# Patient Record
Sex: MALE | Race: WHITE | NOT HISPANIC OR LATINO | Employment: PART TIME | URBAN - METROPOLITAN AREA
[De-identification: names, ages, dates, MRNs, and addresses within clinical notes are randomized per-mention and may not be internally consistent; named-entity substitution may affect disease eponyms.]

---
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
## 2020-08-03 ENCOUNTER — COMMUNICATION - HEALTHEAST (OUTPATIENT)
Dept: SCHEDULING | Facility: CLINIC | Age: 20
End: 2020-08-03

## 2020-08-03 ENCOUNTER — COMMUNICATION - HEALTHEAST (OUTPATIENT)
Dept: UROLOGY | Facility: CLINIC | Age: 20
End: 2020-08-03

## 2020-08-03 ENCOUNTER — OFFICE VISIT - HEALTHEAST (OUTPATIENT)
Dept: UROLOGY | Facility: CLINIC | Age: 20
End: 2020-08-03

## 2020-08-03 DIAGNOSIS — N13.2 HYDRONEPHROSIS WITH URINARY OBSTRUCTION DUE TO URETERAL CALCULUS: ICD-10-CM

## 2020-08-03 DIAGNOSIS — R10.9 LEFT FLANK PAIN: ICD-10-CM

## 2020-08-03 DIAGNOSIS — N20.1 CALCULUS OF URETER: ICD-10-CM

## 2020-08-05 ENCOUNTER — AMBULATORY - HEALTHEAST (OUTPATIENT)
Dept: UROLOGY | Facility: CLINIC | Age: 20
End: 2020-08-05

## 2020-08-05 ENCOUNTER — COMMUNICATION - HEALTHEAST (OUTPATIENT)
Dept: UROLOGY | Facility: CLINIC | Age: 20
End: 2020-08-05

## 2020-08-05 ENCOUNTER — AMBULATORY - HEALTHEAST (OUTPATIENT)
Dept: LAB | Facility: CLINIC | Age: 20
End: 2020-08-05

## 2020-08-05 DIAGNOSIS — N20.1 CALCULUS OF URETER: ICD-10-CM

## 2020-08-07 ENCOUNTER — COMMUNICATION - HEALTHEAST (OUTPATIENT)
Dept: SCHEDULING | Facility: CLINIC | Age: 20
End: 2020-08-07

## 2020-08-07 ENCOUNTER — ANESTHESIA - HEALTHEAST (OUTPATIENT)
Dept: SURGERY | Facility: CLINIC | Age: 20
End: 2020-08-07

## 2020-08-07 ENCOUNTER — SURGERY - HEALTHEAST (OUTPATIENT)
Dept: SURGERY | Facility: CLINIC | Age: 20
End: 2020-08-07

## 2020-08-07 ASSESSMENT — MIFFLIN-ST. JEOR: SCORE: 1886.22

## 2020-08-10 ENCOUNTER — AMBULATORY - HEALTHEAST (OUTPATIENT)
Dept: SURGERY | Facility: CLINIC | Age: 20
End: 2020-08-10

## 2020-08-10 ENCOUNTER — AMBULATORY - HEALTHEAST (OUTPATIENT)
Dept: UROLOGY | Facility: CLINIC | Age: 20
End: 2020-08-10

## 2020-08-10 DIAGNOSIS — N20.1 CALCULUS OF URETER: ICD-10-CM

## 2020-08-10 DIAGNOSIS — Z11.59 ENCOUNTER FOR SCREENING FOR OTHER VIRAL DISEASES: ICD-10-CM

## 2020-08-18 ENCOUNTER — AMBULATORY - HEALTHEAST (OUTPATIENT)
Dept: LAB | Facility: CLINIC | Age: 20
End: 2020-08-18

## 2020-08-18 DIAGNOSIS — Z11.59 ENCOUNTER FOR SCREENING FOR OTHER VIRAL DISEASES: ICD-10-CM

## 2020-08-19 ENCOUNTER — COMMUNICATION - HEALTHEAST (OUTPATIENT)
Dept: UROLOGY | Facility: CLINIC | Age: 20
End: 2020-08-19

## 2020-08-19 DIAGNOSIS — N20.1 CALCULUS OF URETER: ICD-10-CM

## 2020-08-19 DIAGNOSIS — R11.0 NAUSEA: ICD-10-CM

## 2020-08-20 ENCOUNTER — ANESTHESIA - HEALTHEAST (OUTPATIENT)
Dept: SURGERY | Facility: CLINIC | Age: 20
End: 2020-08-20

## 2020-08-21 ENCOUNTER — SURGERY - HEALTHEAST (OUTPATIENT)
Dept: SURGERY | Facility: CLINIC | Age: 20
End: 2020-08-21

## 2020-08-21 ENCOUNTER — COMMUNICATION - HEALTHEAST (OUTPATIENT)
Dept: UROLOGY | Facility: CLINIC | Age: 20
End: 2020-08-21

## 2020-08-21 DIAGNOSIS — N20.1 CALCULUS OF URETER: ICD-10-CM

## 2020-08-21 ASSESSMENT — MIFFLIN-ST. JEOR: SCORE: 1777.36

## 2020-08-27 ENCOUNTER — AMBULATORY - HEALTHEAST (OUTPATIENT)
Dept: UROLOGY | Facility: CLINIC | Age: 20
End: 2020-08-27

## 2020-08-27 ENCOUNTER — COMMUNICATION - HEALTHEAST (OUTPATIENT)
Dept: UROLOGY | Facility: CLINIC | Age: 20
End: 2020-08-27

## 2020-08-27 DIAGNOSIS — N20.1 CALCULUS OF URETER: ICD-10-CM

## 2020-08-31 ENCOUNTER — COMMUNICATION - HEALTHEAST (OUTPATIENT)
Dept: UROLOGY | Facility: CLINIC | Age: 20
End: 2020-08-31

## 2021-05-25 ENCOUNTER — RECORDS - HEALTHEAST (OUTPATIENT)
Dept: ADMINISTRATIVE | Facility: CLINIC | Age: 21
End: 2021-05-25

## 2021-05-30 ENCOUNTER — RECORDS - HEALTHEAST (OUTPATIENT)
Dept: ADMINISTRATIVE | Facility: CLINIC | Age: 21
End: 2021-05-30

## 2021-06-04 VITALS — HEIGHT: 74 IN | WEIGHT: 156 LBS | BODY MASS INDEX: 20.02 KG/M2

## 2021-06-04 VITALS — HEIGHT: 74 IN | BODY MASS INDEX: 23.1 KG/M2 | WEIGHT: 180 LBS

## 2021-06-10 NOTE — TELEPHONE ENCOUNTER
Patient called stating that he just arrived home from surgery and he will not have enough pain medication to last until his stent removal.  He is requesting a refill.  He states that he was given 4 today, but that will not be enough.  Geri Danielson RN

## 2021-06-10 NOTE — ANESTHESIA POSTPROCEDURE EVALUATION
Patient: Jarvis Brooks  Procedure(s):  CYSTOURETEROSCOPY, WITH RETROGRADE PYELOGRAM, AND STENT INSERTION LEFT (Left)  Anesthesia type: general    Patient location: Phase II Recovery  Last vitals:   Vitals Value Taken Time   /74 8/7/2020  3:15 PM   Temp 36.7  C (98.1  F) 8/7/2020  3:15 PM   Pulse 65 8/7/2020  3:18 PM   Resp 16 8/7/2020  3:15 PM   SpO2 100 % 8/7/2020  3:18 PM   Vitals shown include unvalidated device data.  Post vital signs: stable  Level of consciousness: awake, alert and oriented  Post-anesthesia pain: pain controlled  Post-anesthesia nausea and vomiting: no  Pulmonary: unassisted, return to baseline  Cardiovascular: stable and blood pressure at baseline  Hydration: adequate  Anesthetic events: no    QCDR Measures:  ASA# 11 - Amaya-op Cardiac Arrest: ASA11B - Patient did NOT experience unanticipated cardiac arrest  ASA# 12 - Amaya-op Mortality Rate: ASA12B - Patient did NOT die  ASA# 13 - PACU Re-Intubation Rate: ASA13B - Patient did NOT require a new airway mgmt  ASA# 10 - Composite Anes Safety: ASA10A - No serious adverse event    Additional Notes:

## 2021-06-10 NOTE — ANESTHESIA PREPROCEDURE EVALUATION
Anesthesia Evaluation      Patient summary reviewed   No history of anesthetic complications     Airway   Mallampati: I   Pulmonary - normal exam   (+) asthma  a smoker                         Cardiovascular - normal exam  Exercise tolerance: > or = 4 METS   Neuro/Psych    (+) depression,     Comments: ADHD    Endo/Other - negative ROS      GI/Hepatic/Renal    (+)   chronic renal disease (Nephrolithiasis),   (-) GERD          Dental - normal exam                        Anesthesia Plan  Planned anesthetic: general LMA and total IV anesthesia  COVID negative 8/5/20  ASA 2   Induction: intravenous   Anesthetic plan and risks discussed with: patient  Anesthesia plan special considerations: antiemetics,   Post-op plan: routine recovery

## 2021-06-10 NOTE — ANESTHESIA CARE TRANSFER NOTE
Last vitals:   Vitals:    08/21/20 0825   BP: 105/70   Pulse: (!) 58   Resp: 14   Temp: 36.2  C (97.2  F)   SpO2: 100%     Patient's level of consciousness is awake  Spontaneous respirations: yes  Maintains airway independently: yes  Dentition unchanged: yes  Oropharynx: oropharynx clear of all foreign objects    QCDR Measures:  ASA# 20 - Surgical Safety Checklist: WHO surgical safety checklist completed prior to induction    PQRS# 430 - Adult PONV Prevention: 4558F - Pt received => 2 anti-emetic agents (different classes) preop & intraop  ASA# 8 - Peds PONV Prevention: NA - Not pediatric patient, not GA or 2 or more risk factors NOT present  PQRS# 424 - Amaya-op Temp Management: 4559F - At least one body temp DOCUMENTED => 35.5C or 95.9F within required timeframe  PQRS# 426 - PACU Transfer Protocol: - Transfer of care checklist used  ASA# 14 - Acute Post-op Pain: ASA14B - Patient did NOT experience pain >= 7 out of 10

## 2021-06-10 NOTE — TELEPHONE ENCOUNTER
Patient has upcoming surgery on Friday for kidney stone.  He is requesting a refill of pain medication and zofran.  zofran was ordered.  Geri Danielson RN

## 2021-06-10 NOTE — PROGRESS NOTES
Patient came in to clinic for stent removal.  Stent was removed intact and patient tolerated the procedure.  Written and verbal instructions and after care given to patient.  Geri Danielson RN

## 2021-06-10 NOTE — TELEPHONE ENCOUNTER
Message left for patient to call clinic to set up an appointment for kidney stone follow-up for today or tomorrow.  Geri Danielson RN

## 2021-06-10 NOTE — ANESTHESIA CARE TRANSFER NOTE
Last vitals:   Vitals:    08/07/20 1354   BP: 110/54   Pulse: (!) 57   Resp: 12   Temp: 36  C (96.8  F)   SpO2: 100%     Patient's level of consciousness is drowsy  Spontaneous respirations: yes  Maintains airway independently: yes  Dentition unchanged: yes  Oropharynx: oral airway in place    QCDR Measures:  ASA# 20 - Surgical Safety Checklist: WHO surgical safety checklist completed prior to induction    PQRS# 430 - Adult PONV Prevention: 4558F - Pt received => 2 anti-emetic agents (different classes) preop & intraop  ASA# 8 - Peds PONV Prevention: NA - Not pediatric patient, not GA or 2 or more risk factors NOT present  PQRS# 424 - Amaya-op Temp Management: 4559F - At least one body temp DOCUMENTED => 35.5C or 95.9F within required timeframe  PQRS# 426 - PACU Transfer Protocol: - Transfer of care checklist used  ASA# 14 - Acute Post-op Pain: ASA14B - Patient did NOT experience pain >= 7 out of 10

## 2021-06-10 NOTE — TELEPHONE ENCOUNTER
"Pt called stated he had a kidney surgery today, and they didn't remove the stone, and he was sent home with stent placed. According to the Surgeon Sera A  note\" all efforts are made to clear stone in this operative procedure but access to the stone is unfortunately impossible\" pt was explained why he has stent placed, and that he need to be seen in two weeks in the OR for stone clearance. Pt stated he wants to know if the stone will come out in the urine, and if he can talk to the on call provider, writer paged the on call provider and related the pt's question, provider stated it is unlikely the stone to passes in the urine. Pt was informed, and he requested a phone call regarding setting up the appointment in two weeks. Pt was offered the scheduling phone number, stated he wants a phone call. Message will be sent to ENEDINA Crook RN  "

## 2021-06-10 NOTE — TELEPHONE ENCOUNTER
Patient returned call and states that he would like to have his stone removed as it has been extremely painful.  Provider updated and start the process.  Geri Danielson RN

## 2021-06-10 NOTE — ANESTHESIA PREPROCEDURE EVALUATION
Anesthesia Evaluation      Patient summary reviewed   No history of anesthetic complications     Airway   Mallampati: I   Pulmonary - normal exam   (+) asthma  a smoker                         Cardiovascular - normal exam  Exercise tolerance: > or = 4 METS   Neuro/Psych    (+) depression,     Comments: ADHD    Endo/Other - negative ROS      GI/Hepatic/Renal    (+)   chronic renal disease (Nephrolithiasis),   (-) GERD          Dental - normal exam                          Anesthesia Plan  Planned anesthetic: general LMA  COVID negative 8/5/20    Background propofol  Decadron 10 mg  Zofran  ASA 2   Induction: intravenous   Anesthetic plan and risks discussed with: patient  Anesthesia plan special considerations: antiemetics,   Post-op plan: routine recovery

## 2021-06-10 NOTE — PROGRESS NOTES
"Assessment/Plan:        Diagnoses and all orders for this visit:    Calculus of ureter  -     Symptom Control While Passing a Stone Education  -     CT Abdomen Pelvis Without Oral Without IV Contrast; Future; Expected date: 08/17/2020  -     Patient Stated Goal: Pass my stone    Hydronephrosis with urinary obstruction due to ureteral calculus    Left flank pain    Other orders  -     tamsulosin (FLOMAX) 0.4 mg cap; Take 0.4 mg by mouth daily.      Stone Management Plan  [facility] Stone Management 8/3/2020   Urinary Tract Infection No suspicion of infection   Renal Colic Well controlled symptoms   Renal Failure No suspicion of renal failure   Current CT date 8/2/2020   R Stone Event No current event   Left sided stones? Yes   L Number of ureteral stones 1   L GSD of ureteral stones 4   L Location of ureteral stone Proximal   L Number of kidney stones  No renal stones   L GSD of kidney stones N/A   L Hydronephrosis Mild   L Stone Event New event   Diagnosis date 8/2/2020   Initial location of primary symptomatic stone Proximal   Initial GSD of primary symptomatic stone 4   L MET Status Initiation   L Current Plan MET   MET 2 week F/U         Phone call duration: 10 minutes    Annabel Shabazz PA-C    Subjective:        The patient has been notified of following:     \"This telephone visit will be conducted via a call between you and your physician/provider. We have found that certain health care needs can be provided without the need for a physical exam.  This service lets us provide the care you need with a short phone conversation. If a prescription is necessary, we can send it directly to your pharmacy.  If labs and/or imaging are needed, we can place orders so that you can have the test (s) done at a later time.    If during the course of the call the physician/provider feels a telephone visit is not appropriate, you will not be charged for this service.\"     HPI  Mr. Jarvis Brooks is a 20 y.o.  male who is being " evaluated via a billable telephone visit by [facility] following Dawson's ER visit for urolithiasis.    He is a first time unidentified composition stone former. He has no identified modifiable stone risk factors. He has identified non-modifiable stone risks including:  early age at first stone and limited family history.    He was seen in ER yesterday for acute onset left flank pain x 1 day. The pain occurred while he was driving home from [place]. It was constant and radiated to the left lower abdomen. It reached 9/10 at its worst with no relief following use of ibuprofen. He had associated chills, nausea, vomiting and shortness of breath due to pain intensity. Workup was notable for CT reporting an obstructing left ureteral stone. Labs were notable for suspicious UA, leukocytosis with slightly elevated CRP and mild renal injury. He was given dose of antibiotic and sent home with ciprofloxacin, zofran and oxycodone.    Significant current symptoms include: mild left flank pain and hematuria, taking tylenol. Pertinent negative current symptoms include:  fever, chills, right flank pain, nausea, vomiting, urinary frequency and dysuria.      CT scan from 8/2/20 is personally reviewed and demonstrates a mildly obstructing 4 mm left proximal ureteral stone.    Significant labs from presentation include severe hematuria, no pyuria, negative nitrite, moderate bacteria, no growth on urine culture, elevated WBC, slightly elevated C reactive protein, slightly elevated creatinine and normal potassium.    PLAN    21 yo M with newly diagnosed obstructing left ureteral stone, symptoms currently controlled.    Will proceed with medical expulsive therapy. Risks and benefits were detailed of medical expulsive therapy including probability of stone passage, recurrent renal colic, and requirement of emergency medical and/or surgical care and further imaging. Patient verbalized understanding. Patient  agrees with plan as discussed. He will return in 2 weeks with low dose CT scan.    For symptom control, he has oxycodone and ondansetron. Over the counter symptom control medications of ibuprofen, Dramamine and Tylenol were recommended.     ROS   A 12 point comprehensive review of systems is negative except for HPI    Past Medical History:   Diagnosis Date     ADHD (attention deficit hyperactivity disorder)      Appendicitis      Asthma, intermittent      Depression      Foot fracture      Kidney stones      Past Surgical History:   Procedure Laterality Date     APPENDECTOMY       Current Outpatient Medications   Medication Sig Dispense Refill     acetaminophen (TYLENOL) 500 MG tablet Take 500-1,000 mg by mouth every 6 (six) hours as needed for pain.       acetaminophen (TYLENOL) 500 MG tablet Take 2 tablets (1,000 mg total) by mouth 4 (four) times a day for 7 days. 56 tablet 0     albuterol (PROVENTIL HFA;VENTOLIN HFA) 90 mcg/actuation inhaler Inhale 2 puffs every 6 (six) hours as needed for wheezing.       albuterol (PROVENTIL) 2.5 mg /3 mL (0.083 %) nebulizer solution Take 2.5 mg by nebulization every 6 (six) hours as needed for wheezing.       azithromycin (ZITHROMAX) 250 MG tablet 500 mg x1 day then 250 mg x4 days. Prescription filled 9/10/14.       ciprofloxacin HCl (CIPRO) 500 MG tablet Take 1 tablet (500 mg total) by mouth 2 (two) times a day for 7 days. 14 tablet 0     citalopram (CELEXA) 10 MG tablet Take 10 mg by mouth daily.       clindamycin-benzoyl peroxide (BENZACLIN) gel Apply 1 application topically 2 (two) times a day. For acne       dimenhyDRINATE (DRAMAMINE) 50 MG tablet Take 1 tablet (50 mg total) by mouth at bedtime for 7 days. 7 tablet 0     dimenhyDRINATE (DRAMAMINE) 50 MG tablet Take 1 tablet (50 mg total) by mouth 4 (four) times a day as needed. 28 tablet 0     ibuprofen (ADVIL,MOTRIN) 200 MG tablet Take 2 tablets (400 mg total) by mouth 4 (four) times a day for 7 days. 56 tablet 0      methylphenidate (RITALIN LA) 10 MG 24 hr capsule Take 10 mg by mouth every morning. (take with 40 mg capsule to = 50 mg)       methylphenidate (RITALIN LA) 40 MG 24 hr capsule Take 40 mg by mouth every morning. (take with 10 mg cap to = 50 mg)       ondansetron (ZOFRAN ODT) 4 MG disintegrating tablet Take 1 tablet (4 mg total) by mouth every 8 (eight) hours as needed for nausea. 12 tablet 0     oxyCODONE (ROXICODONE) 5 MG immediate release tablet Every 4-6 hours as needed if pain is not improved with acetaminophen and ibuprofen. 12 tablet 0     No current facility-administered medications for this visit.      Allergies   Allergen Reactions     Amoxicillin Hives       Social History     Socioeconomic History     Marital status: Single     Spouse name: Not on file     Number of children: Not on file     Years of education: Not on file     Highest education level: Not on file   Occupational History     Not on file   Social Needs     Financial resource strain: Not on file     Food insecurity     Worry: Not on file     Inability: Not on file     Transportation needs     Medical: Not on file     Non-medical: Not on file   Tobacco Use     Smoking status: Current Every Day Smoker   Substance and Sexual Activity     Alcohol use: Not on file     Drug use: Not on file     Sexual activity: Not on file   Lifestyle     Physical activity     Days per week: Not on file     Minutes per session: Not on file     Stress: Not on file   Relationships     Social connections     Talks on phone: Not on file     Gets together: Not on file     Attends Alevism service: Not on file     Active member of club or organization: Not on file     Attends meetings of clubs or organizations: Not on file     Relationship status: Not on file     Intimate partner violence     Fear of current or ex partner: Not on file     Emotionally abused: Not on file     Physically abused: Not on file     Forced sexual activity: Not on file   Other Topics Concern      Not on file   Social History Narrative    Lives at home with mother and younger sister.        Family History   Problem Relation Age of Onset     Asthma Neg Hx      Allergies Unknown         multiple family members     Lung cancer Maternal Grandfather        Objective:      Physical Exam  There were no vitals filed for this visit.    Labs    Urinalysis POC (Office):  Nitrite, UA   Date Value Ref Range Status   08/02/2020 Negative Negative Final   03/03/2011 Negative (Negative) Final       Lab Urinalysis:  Blood, UA   Date Value Ref Range Status   08/02/2020 Large (!) Negative Final   03/03/2011 Large (!) (Negative) Final     Nitrite, UA   Date Value Ref Range Status   08/02/2020 Negative Negative Final   03/03/2011 Negative (Negative) Final     Leukocytes, UA   Date Value Ref Range Status   08/02/2020 Large (!) Negative Final   03/03/2011 Negative (Negative) Final     pH, UA   Date Value Ref Range Status   08/02/2020 6.0 4.5 - 8.0 Final   03/03/2011 6.5 4.5 - 8.0 Final    and Acute Labs   CBC   WBC   Date Value Ref Range Status   08/02/2020 17.3 (H) 4.0 - 11.0 thou/uL Final   05/24/2016 8.3 4.5 - 13.0 thou/uL Final   09/11/2014 13.2 (H) 5.0 - 11.5 thou/uL Final   08/14/2013 6.8 5.0 - 11.5 thou/uL Final     Hemoglobin   Date Value Ref Range Status   08/02/2020 15.9 14.0 - 18.0 g/dL Final   05/24/2016 14.8 13.0 - 16.0 g/dL Final   09/11/2014 15.1 14.0 - 18.0 g/dL Final     Platelets   Date Value Ref Range Status   08/02/2020 333 140 - 440 thou/uL Final   05/24/2016 233 140 - 440 thou/uL Final   09/11/2014 279 140 - 440 thou/uL Final   , C Reactive Protein    CRP   Date Value Ref Range Status   08/02/2020 2.5 (H) 0.0 - 0.8 mg/dL Final   , Renal Panel  [facility]  Creatinine   Date Value Ref Range Status   08/02/2020 1.69 (H) 0.70 - 1.30 mg/dL Final   08/06/2018 1.12 0.70 - 1.30 mg/dL Final   05/24/2016 0.97 0.70 - 1.30 mg/dL Final     Potassium   Date Value Ref Range Status   08/02/2020 3.6 3.5 - 5.0 mmol/L Final   08/06/2018  4.4 3.5 - 5.0 mmol/L Final   05/24/2016 4.0 3.5 - 5.0 mmol/L Final     Calcium   Date Value Ref Range Status   08/02/2020 10.4 8.5 - 10.5 mg/dL Final   08/06/2018 10.0 8.5 - 10.5 mg/dL Final   05/24/2016 9.2 8.5 - 10.5 mg/dL Final    and Urine Culture    Culture   Date Value Ref Range Status   08/02/2020 No Growth  Final   08/06/2018 No Growth  Final

## 2021-06-10 NOTE — ANESTHESIA POSTPROCEDURE EVALUATION
Patient: Jarvis Brooks  Procedure(s):  CYSTOSCOPY, STENT EXTRACTION, WITH FLEXIBLE URETEROSCOPIC CALCULUS REMOVAL  AND STENT INSERTION (Left)  Anesthesia type: general    Patient location: Phase II Recovery  Last vitals:   Vitals Value Taken Time   /58 8/21/2020  9:45 AM   Temp 36.7  C (98  F) 8/21/2020  8:45 AM   Pulse 69 8/21/2020  9:45 AM   Resp 20 8/21/2020  9:45 AM   SpO2 99 % 8/21/2020  9:45 AM     Post vital signs: stable  Level of consciousness: awake, alert and oriented  Post-anesthesia pain: pain controlled  Post-anesthesia nausea and vomiting: no  Pulmonary: unassisted, return to baseline  Cardiovascular: stable and blood pressure at baseline  Hydration: adequate  Anesthetic events: no    QCDR Measures:  ASA# 11 - Amaya-op Cardiac Arrest: ASA11B - Patient did NOT experience unanticipated cardiac arrest  ASA# 12 - Amaya-op Mortality Rate: ASA12B - Patient did NOT die  ASA# 13 - PACU Re-Intubation Rate: ASA13B - Patient did NOT require a new airway mgmt  ASA# 10 - Composite Anes Safety: ASA10A - No serious adverse event    Additional Notes:

## 2021-06-10 NOTE — TELEPHONE ENCOUNTER
LM with mom as she states Jarvis was getting prescriptions at Pharmacy.     Instructed to call back and speak with our RN Geri re: bounce back ER visit for colic secondary obstructing left ureteral stone    CRP improved, repeat urine cx no growth    Would recommend scheduling OR this week if able to arrange

## 2021-06-10 NOTE — TELEPHONE ENCOUNTER
[facility] last night. Surgery this morning. He is vomiting and in a great amount of pain.  Should I just bring him to the ER for them to manage him before surgery? I told her that is appropriate and bring a bag with in case he needs to vomit on the way into the ER.  Mom will bring him to [facility] and will notify the ER team that he is scheduled to have surgery today.  Akua Simpson RN  [facility]      Reason for Disposition    Information only question and nurse able to answer    Additional Information    Negative: Nursing judgment    Negative: Nursing judgment    Negative: Nursing judgment    Negative: Nursing judgment    Protocols used: NO PROTOCOL AVAILABLE - INFORMATION ONLY-A-[place]

## 2021-06-10 NOTE — TELEPHONE ENCOUNTER
Patient called stating that [facility] is on back order for oxycodone for a couple of weeks.  Patient would like prescription to be sent to [facility] please.  Geri Danielson RN

## 2021-06-10 NOTE — PATIENT INSTRUCTIONS - HE
Patient Stated Goal: Pass my stone  Symptom Control While Passing A Stone    The goal of [facility] is to let a smaller kidney stone (less than 4 to 5 mm) pass without intervention if possible. Giving your body a chance to clear the stone may take a few hours up to a few weeks.  Keeping you well-informed, safe and fairly comfortable is important.    Drink to thirst  Do not attempt to  flush out  your stone by drinking too much fluid. This does not work and may increase nausea. Drink enough to satisfy your body s thirst. Eating your normal diet is fine.   Medications (that may be suggested or prescribed)    Ibuprofen (Advil or Motrin) Available over the counter  o Take two (200mg) tablets every six hours until the stone passes.  o Prevents spasm of the ureter.    o Decreases pain.      Dramamine* (drowsy version, non-generic formulation) Available over the counter  o Take 50mg at bedtime  o Decreases spasms of the ureter  o Decreases nausea  o Decreases acute pain  o Decreases recurrence of pain for 24 hours  o Will help you sleep  *This medication will cause increase drowsiness, do not drive or operate machinery for 6 hours.      Narcotics (Percocet, Vicodin, Dilaudid) Take as prescribed for severe pain unrelieved by ibuprofen and Dramamine  o Narcotics have significant side effects and only  cover-up  pain. They have no effect on the cause of pain.  o Common side effects  - Confusion, disorientation and sedation - DO NOT DRIVE OR OPERATE MACHINERY WITHIN 24 HOURS  - Nausea - take Dramamine or Zofran or Haldol to help control  - Constipation  - Sleep disturbances      Ondansetron (Zofran) Take as prescribed  o Reserve for severe nausea  o May cause constipation, start over the counter Miralax if needed      Second Line Anti-Nausea Medication: Adding a different anti-nausea medication maybe helpful for persistent nausea.  The combined effect of different types of anti-nausea medications maybe more  effective than either medication by itself, even in higher doses.  o Compazine: Take as prescribed      Information about kidney stones    Crystals can form if chemicals are too concentrated in your urine. If the crystal grows over time, a stone may form. A stone usually isn t painful while it is still in the kidney.    As the stone begins to leave the kidney, you may experience episodes of flank pain as the kidney stone approaches the entrance to the ureter. Some people feel a vague ache in the side.    Kidney stones may fall into the ureter. Some stones are tiny and pass through without causing symptoms. The ureter is a small tube (approximately 1/8 of an inch wide). A kidney stone can get stuck and block the ureter. If this happens, urine backs up and flows back to the kidney. Back pressure on the kidney can cause:  o Severe flank pain radiating to the groin.  o Severe nausea and vomiting.  o The pain can occur in the lower back, side, groin or all three.      When the stone reaches the lower ureter, this can irritate the bladder and sensations of feeling the urge to urinate frequently and urgently may occur.      Once the stone passes out of your ureter and into your bladder, the symptoms of urgency and frequency will often disappear. Sometimes pain will come back for a short period and will not be as severe as before. The passage of the stone from your bladder and out of your body is usually not a problem. The urethra is at least twice as wide as the normal ureter, so the stone doesn t usually block it.    Strain all urine  If you pass the stone, save it. Place it in the container we have provided and bring it to the [facility] within a week of passing it. Your stone will then be sent for analysis which takes about a month.     Signs and symptoms you might experience    Nausea    Decreased appetite    Urinary frequency    Bloody urine     Chills    Fatigue    When to call [facility] or  go to the Emergency Room    Fever with a temperature greater than 100.1    Severe pain    Persistent nausea/vomiting    If the pain worsens or nausea/vomiting is uncontrolled with medications, STOP eating & drinking. You need to have an empty stomach for 8 hours prior to surgery. Call the [facility] immediately at [phone].           Follow-up    Low dose CT scan with doctor visit 1-2 weeks after initial clinic visit per doctor s instructions    Please cancel the CT scan visit if you pass a stone. Reschedule for a one month follow-up with doctor to discuss stone composition and future prevention.    Preventing future stones    Approximately a month after your stone is sent out for analysis, a prevention visit will occur with your provider, to discuss an individualized plan for prevention of new stones and to discuss managing stones that you may still have. Along with the analysis of the kidney stone, blood and urine tests may be indicated to develop this plan. Knowing the type of kidney stones you make, and why, allows the providers at the [facility] to recommend specific ways to prevent them.    Follow-up visits are an important part of monitoring and preventing future re-occurrences.    The [facility] is available for questions or concerns 24 hours a day at [phone]

## 2021-06-10 NOTE — TELEPHONE ENCOUNTER
Patient is here for stent removal, he is out of pain medication and is asking for a small refill.  Geri Danielson RN

## 2021-06-11 NOTE — TELEPHONE ENCOUNTER
Message left for patient to follow up on a voice mail that was left on the clinic inbox.    Geri Danielson RN

## 2021-12-14 ENCOUNTER — APPOINTMENT (OUTPATIENT)
Dept: RADIOLOGY | Facility: HOSPITAL | Age: 21
End: 2021-12-14
Attending: EMERGENCY MEDICINE

## 2021-12-14 ENCOUNTER — HOSPITAL ENCOUNTER (EMERGENCY)
Facility: HOSPITAL | Age: 21
Discharge: HOME | End: 2021-12-14
Attending: EMERGENCY MEDICINE | Admitting: EMERGENCY MEDICINE

## 2021-12-14 VITALS
HEART RATE: 76 BPM | SYSTOLIC BLOOD PRESSURE: 118 MMHG | OXYGEN SATURATION: 100 % | RESPIRATION RATE: 20 BRPM | TEMPERATURE: 98.6 F | BODY MASS INDEX: 19.25 KG/M2 | HEIGHT: 74 IN | WEIGHT: 150 LBS | DIASTOLIC BLOOD PRESSURE: 57 MMHG

## 2021-12-14 DIAGNOSIS — R07.89 CHEST WALL PAIN: ICD-10-CM

## 2021-12-14 PROCEDURE — 99284 EMERGENCY DEPT VISIT MOD MDM: CPT | Mod: 25

## 2021-12-14 PROCEDURE — 93005 ELECTROCARDIOGRAM TRACING: CPT | Performed by: EMERGENCY MEDICINE

## 2021-12-14 PROCEDURE — 71046 X-RAY EXAM CHEST 2 VIEWS: CPT

## 2021-12-14 PROCEDURE — 250N000013 HC RX MED GY IP 250 OP 250 PS 637: Performed by: EMERGENCY MEDICINE

## 2021-12-14 RX ORDER — IBUPROFEN 600 MG/1
600 TABLET, FILM COATED ORAL ONCE
Status: COMPLETED | OUTPATIENT
Start: 2021-12-14 | End: 2021-12-14

## 2021-12-14 RX ADMIN — IBUPROFEN 600 MG: 600 TABLET, FILM COATED ORAL at 13:08

## 2021-12-14 ASSESSMENT — MIFFLIN-ST. JEOR: SCORE: 1750.15

## 2021-12-14 NOTE — DISCHARGE INSTRUCTIONS
You were seen today in the emergency department at [facility] for right-sided chest wall pain.  Your x-ray was negative for any evidence of pneumonia or collapsed lung.  We are reassured by your current vital signs and oxygen levels which are stable and appropriate.  Right now we do not suspect any serious or life-threatening cause of your symptoms.  We would treat you for pleurisy with naproxen 500 mg twice a day and Tylenol 500 mg every 6 hours for pain.  You can also  4% lidocaine patches and keep those on for 12 hours at a time on the area of greatest discomfort.  Please avoid working out and strenuous activities until you are fully recovered and will try to get this at least 5 days to make sure you are feeling better.  You can use the information above to set up follow-up with the clinic if you have any lingering concerns into next week.  If you have any severe worsening shortness of breath, lightheadedness, or other immediate concern, we should reevaluate you in the emergency department.

## 2021-12-14 NOTE — ED TRIAGE NOTES
Patient presdents to ed for evaluation of chest and back pain with deep breaths since last night. Patient appears anxious in triage.   normal (ped)...

## 2021-12-14 NOTE — ED PROVIDER NOTES
EMERGENCY DEPARTMENT ENCOUNTER      NAME: Jarvis Brooks  AGE: 21 year old male  YOB: 2000  MRN: [id number]  EVALUATION DATE & TIME: No admission date for patient encounter.    PCP: Ernesto Rubio    ED PROVIDER: Larry Gallardo M.D.      Chief Complaint   Patient presents with     Chest Wall Pain         FINAL IMPRESSION:  1. Chest wall pain          ED COURSE & MEDICAL DECISION MAKIN year old male presents to the Emergency Department for evaluation of right-sided chest pain.  Patient is vitally stable here in the emergency department, initially somewhat tachycardic anxious and uncomfortable on arrival.  Improved after ibuprofen here.  Cardiopulmonary exam is unrevealing.  Chest x-ray negative for pneumothorax or infiltrate.  No infectious symptoms, fever, cough.  No venous thromboembolism risk factors, no unilateral leg swelling.  Initial tachycardia would be the only thing prohibiting him from being PERC negative, this resolved after ibuprofen while resting awaiting evaluation.  Did discuss with the patient the possibility of some additional work-up including a D-dimer however at this point I think it is reasonable to defer and treat conservatively for pleurisy and chest wall discomfort.  Patient was comfortable with this plan.  Also discussed things with the patient's mother over the telephone.  He was discharged in stable condition.  Recommended follow-up by next week if symptoms are persistent.  ED return precautions reviewed.    2:06 PM Introduced myself to the patient, obtained history of present illness, and performed initial physical exam. PPE: Provider wore gloves, N95 mask, eye protection, and paper mask.   2:11 PM Discussed discharge with the patient who is agreeable with this plan.    MEDICATIONS GIVEN IN THE EMERGENCY:  Medications   ibuprofen (ADVIL/MOTRIN) tablet 600 mg (600 mg Oral Given 21 1308)       NEW PRESCRIPTIONS STARTED AT TODAY'S ER VISIT  New Prescriptions     No medications on file          =================================================================    HPI    Patient information was obtained from: the patient    Use of : N/A      Jarvis Brooks is a 21 year old male with a pertinent history of intermittent asthma, kidney stones, and appendicitis, who presents to this ED for evaluation of chest wall pain.    Since last night, the patient has had pain with respiration. He states that the pain occurs in his chest and back, and it is worse on the right side. He denies any strenuous activity at time of onset. He originally thought it was muscle pain from working out, but notes that it has persisted more than it normally does with muscle soreness. The patient denies fever, cough, leg edema, and any other symptoms at this time.    REVIEW OF SYSTEMS   All systems reviewed and negative except as noted in HPI.    PAST MEDICAL HISTORY:  Past Medical History:   Diagnosis Date     ADHD (attention deficit hyperactivity disorder)      Appendicitis      Asthma, intermittent      Depression      Foot fracture      Kidney stones        PAST SURGICAL HISTORY:  Past Surgical History:   Procedure Laterality Date     APPENDECTOMY       CHG X-RAY RETROGRADE PYELOGRAM Left 8/7/2020    Procedure: CYSTOURETEROSCOPY, WITH RETROGRADE PYELOGRAM, AND STENT INSERTION LEFT;  Surgeon: Adrián Zamora MD;  Location: [facility];  Service: Urology     COMBINED CYSTOSCOPY, INSERT STENT URETER(S) Left 8/21/2020    Procedure: CYSTOSCOPY, STENT EXTRACTION, WITH FLEXIBLE URETEROSCOPIC CALCULUS REMOVAL  AND STENT INSERTION;  Surgeon: Adrián Zamora MD;  Location: [facility];  Service: Urology           CURRENT MEDICATIONS:    No current facility-administered medications for this encounter.     Current Outpatient Medications   Medication     CITALOPRAM HYDROBROMIDE PO     Methylphenidate HCl (RITALIN PO)     nortriptyline (PAMELOR) 25 MG capsule  "        ALLERGIES:  Allergies   Allergen Reactions     Amoxicillin Hives     Strawberry Hives       FAMILY HISTORY:  Family History   Problem Relation Age of Onset     Allergies Other         multiple family members     Lung Cancer Maternal Grandfather      Asthma No family hx of        SOCIAL HISTORY:   Social History     Socioeconomic History     Marital status: Single     Spouse name: Not on file     Number of children: Not on file     Years of education: Not on file     Highest education level: Not on file   Occupational History     Not on file   Tobacco Use     Smoking status: Current Every Day Smoker     Packs/day: 1.00     Years: 4.00     Pack years: 4.00     Smokeless tobacco: Never Used   Substance and Sexual Activity     Alcohol use: Yes     Alcohol/week: 0.0 standard drinks     Comment: Alcoholic Drinks/day: social very rare 1 drink per year     Drug use: Not Currently     Types: Marijuana     Sexual activity: Not on file   Other Topics Concern     Not on file   Social History Narrative    Lives at home with mother and younger sister.      Social Determinants of Health     Financial Resource Strain: Not on file   Food Insecurity: Not on file   Transportation Needs: Not on file   Physical Activity: Not on file   Stress: Not on file   Social Connections: Not on file   Intimate Partner Violence: Not on file   Housing Stability: Not on file       VITALS:  /68   Pulse 76   Temp 98.6  F (37  C) (Temporal)   Resp 20   Ht 1.88 m (6' 2\")   Wt 68 kg (150 lb)   SpO2 100%   BMI 19.26 kg/m      PHYSICAL EXAM    Constitutional: Well developed, Well nourished, NAD.  HENT: Normocephalic, Atraumatic. Neck Supple.  Eyes: EOMI, Conjunctiva normal.  Respiratory: Breathing comfortably on room air. Speaks full sentences easily. Lungs clear to ascultation.  Cardiovascular: Normal heart rate, Regular rhythm. No peripheral edema.  Abdomen: Soft, nontender  Musculoskeletal: Good range of motion in all major joints. No " major deformities noted.  Integument: Warm, Dry.  Neurologic: Alert & awake, Normal motor function, Normal sensory function, No focal deficits noted.   Psychiatric: Cooperative. Affect appropriate.     LAB:  All pertinent labs reviewed and interpreted.  Labs Ordered and Resulted from Time of ED Arrival to Time of ED Departure - No data to display    RADIOLOGY:  Reviewed all pertinent imaging. Please see official radiology report.  Chest XR,  PA & LAT   Final Result   IMPRESSION: Negative chest. Lungs are clear.                EKG:    Performed at: 958 AM    Impression: Sinus rhythm with sinus arrhythmia, rightward axis    Rate: 99  Rhythm: 124  Axis: Rightward  FL Interval: 124  QRS Interval: 96  QTc Interval: 420  ST Changes: None significant  Comparison: None available    I have independently reviewed and interpreted the EKG(s) documented above.    PROCEDURES:   None    I, Araceli Rosenthal, am serving as a scribe to document services personally performed by Dr. Larry Gallardo, based on my observation and the provider's statements to me. ILarry MD attest that Araceli Rosenthal is acting in a scribe capacity, has observed my performance of the services and has documented them in accordance with my direction.    Larry Gallardo M.D.  Emergency Medicine  [facility] EMERGENCY DEPARTMENT  [facility] [facility] [id number] [phone]  Dept: [phone]     Larry Gallardo MD  12/14/21 7041

## 2025-04-13 ENCOUNTER — HOSPITAL ENCOUNTER (EMERGENCY)
Facility: CLINIC | Age: 25
Discharge: HOME | End: 2025-04-13

## 2025-04-13 VITALS
BODY MASS INDEX: 23.75 KG/M2 | WEIGHT: 185 LBS | SYSTOLIC BLOOD PRESSURE: 139 MMHG | OXYGEN SATURATION: 99 % | HEART RATE: 95 BPM | DIASTOLIC BLOOD PRESSURE: 80 MMHG | RESPIRATION RATE: 16 BRPM | TEMPERATURE: 97.6 F

## 2025-04-13 DIAGNOSIS — H66.011 NON-RECURRENT ACUTE SUPPURATIVE OTITIS MEDIA OF RIGHT EAR WITH SPONTANEOUS RUPTURE OF TYMPANIC MEMBRANE: ICD-10-CM

## 2025-04-13 PROCEDURE — 99283 EMERGENCY DEPT VISIT LOW MDM: CPT

## 2025-04-13 PROCEDURE — 99284 EMERGENCY DEPT VISIT MOD MDM: CPT

## 2025-04-13 PROCEDURE — 96372 THER/PROPH/DIAG INJ SC/IM: CPT

## 2025-04-13 PROCEDURE — 250N000011 HC RX IP 250 OP 636: Mod: JZ

## 2025-04-13 RX ORDER — CEFDINIR 300 MG/1
300 CAPSULE ORAL 2 TIMES DAILY
Qty: 10 CAPSULE | Refills: 0 | Status: SHIPPED | OUTPATIENT
Start: 2025-04-13 | End: 2025-04-18

## 2025-04-13 RX ORDER — CIPROFLOXACIN AND DEXAMETHASONE 3; 1 MG/ML; MG/ML
4 SUSPENSION/ DROPS AURICULAR (OTIC) 2 TIMES DAILY
Qty: 7.5 ML | Refills: 0 | Status: SHIPPED | OUTPATIENT
Start: 2025-04-13 | End: 2025-04-23

## 2025-04-13 RX ORDER — KETOROLAC TROMETHAMINE 15 MG/ML
15 INJECTION, SOLUTION INTRAMUSCULAR; INTRAVENOUS ONCE
Status: COMPLETED | OUTPATIENT
Start: 2025-04-13 | End: 2025-04-13

## 2025-04-13 RX ADMIN — KETOROLAC TROMETHAMINE 15 MG: 15 INJECTION, SOLUTION INTRAMUSCULAR; INTRAVENOUS at 10:10

## 2025-04-13 ASSESSMENT — COLUMBIA-SUICIDE SEVERITY RATING SCALE - C-SSRS
2. HAVE YOU ACTUALLY HAD ANY THOUGHTS OF KILLING YOURSELF IN THE PAST MONTH?: NO
1. IN THE PAST MONTH, HAVE YOU WISHED YOU WERE DEAD OR WISHED YOU COULD GO TO SLEEP AND NOT WAKE UP?: NO
6. HAVE YOU EVER DONE ANYTHING, STARTED TO DO ANYTHING, OR PREPARED TO DO ANYTHING TO END YOUR LIFE?: NO

## 2025-04-13 ASSESSMENT — ACTIVITIES OF DAILY LIVING (ADL): ADLS_ACUITY_SCORE: 41

## 2025-04-13 NOTE — DISCHARGE INSTRUCTIONS
Start antibiotics as directed. Rest and drink plenty of fluids.  You may take ibuprofen and/or Tylenol as needed for pain - see dosing information below.  You may take over-the-counter Mucinex and Flonase nasal spray for congestion.    You may use over-the-counter Cepacol lozenges, Throat Coat tea, tea with honey, salt water gargles, as needed for sore throat.     Tylenol (Acetaminophen) Discharge Instructions:  You may take over-the-counter, Tylenol (acetaminophen) every 4-6 hours as needed for pain.  Take no more than 4000 mg of Tylenol in a 24-hour period for adults, maximum dosing for children is based on weight - please look at medication label.    Avoid taking more than 1 acetaminophen-containing product at a time and be aware that many over-the-counter medications contain a combination of acetaminophen and other products.  If you are taking Tylenol in addition to a combination product please keep track of your daily acetaminophen dose to make sure you do not exceed the recommended 4000 mg.  Taking too much acetaminophen can cause permanent damage to your liver.    Ibuprofen/Naproxen Discharge Instructions:  You may take ibuprofen (600 mg) every 6 hours as needed for pain control.  The maximum dose of (ibuprofen is 3200 mg ) in a 24-hour period for adults, maximum dosing for children is based on weight - please look at medication label.     Take this medication with food to prevent stomach irritation.  With long-term use this medication can irritate the stomach causing pain and lead to development of a stomach ulcer.  If you notice stomach pain or vomiting of coffee-ground colored vomit or blood, please be seen by a healthcare provider.  Attempt to use this medication for the shortest time possible.      Follow-up with your primary care provider for reevaluation, especially if symptoms persist.    Return to the emergency department for any new or worsening symptoms including uncontrollable fever/chills, neck  stiffness, pain/redness/warmth/swelling behind your ear, or any other concerning symptoms.    Take Care!  - Halley Mariano PA-C

## 2025-04-13 NOTE — ED TRIAGE NOTES
"R ear pain and \"feels clogged\" since last night.  Denies injury.     Triage Assessment (Adult)       Row Name 04/13/25 0932          Triage Assessment    Airway WDL WDL        Respiratory WDL    Respiratory WDL WDL        Skin Circulation/Temperature WDL    Skin Circulation/Temperature WDL WDL        Cardiac WDL    Cardiac WDL WDL        Peripheral/Neurovascular WDL    Peripheral Neurovascular WDL WDL        Cognitive/Neuro/Behavioral WDL    Cognitive/Neuro/Behavioral WDL WDL                     "

## 2025-04-13 NOTE — ED PROVIDER NOTES
EMERGENCY DEPARTMENT ENCOUNTER      NAME: Jarvis Brooks  AGE: 25 year old male  YOB: 2000  MRN: [id number]  EVALUATION DATE & TIME: No admission date for patient encounter.    PCP: Ernesto Rubio    ED PROVIDER: Halley Mariano PA-C      Chief Complaint   Patient presents with    Otalgia         FINAL IMPRESSION:  1. Non-recurrent acute suppurative otitis media of right ear with spontaneous rupture of tympanic membrane          ED COURSE & MEDICAL DECISION MAKIN:32 AM Met with patient for initial interview. Plan for care discussed.  10:00 AM Reevaluated and updated patient. I discussed the plan for discharge with the patient, and patient is agreeable. We discussed supportive cares at home and reasons for return to the ER including new or worsening symptoms. All questions and concerns addressed. Patient to be discharged by RN after Toradol.    25 year old male with a history of ADHD, asthma, depression who presents to this ED for evaluation of right ear pain. Upon exam, patient is afebrile, hemodynamically stable, and in no acute distress. Right TM with suspected perforation - no known trauma - with erythema, bulging. No active bleeding. No tenderness over the mastoid to suggest mastoiditis. No cholesteatoma seen on exam. No rash to suggest Chidi Hunt syndrome. Pain and exam are not suggestive of acoustic neuroma or trigeminal neuralgia.    Patient was given Toradol prior to discharge. Plan for antibiotics and follow up with PCP. The patient was advised to follow up with their primary care provider later this week if symptoms persist and return to the ER if new or worsening symptoms develop. The patient was stable and well appearing throughout entire ER visit and upon discharge. The patient verbalizes understanding and agree with the plan.     MIPS (CTPE, Dental pain, [place], Sinusitis, Asthma/COPD, Head Trauma): Not Applicable    SEPSIS: None        MEDICATIONS GIVEN IN THE  EMERGENCY:  Medications   ketorolac (TORADOL) injection 15 mg (15 mg Intramuscular $Given 4/13/25 1010)       NEW PRESCRIPTIONS STARTED AT TODAY'S ER VISIT  Discharge Medication List as of 4/13/2025 10:13 AM        START taking these medications    Details   ciprofloxacin-dexAMETHasone (CIPRODEX) 0.3-0.1 % otic suspension Place 4 drops into the right ear 2 times daily for 10 days., Disp-7.5 mL, R-0, Local Print                =================================================================    HPI    Patient information was obtained from: patient and patient's mother    Use of : N/A       Jarvis Brooks is a 25 year old male with a pertinent history of ADHD, asthma, depression who presents to this ED for evaluation of right ear pain.  Patient reported by his mother who reports that he had multiple recurrent ear infections as a child and had tubes placed in the past.  Patient states that he woke up this morning with severe right ear pain.  He denies any known trauma.  He states he is unsure if his young children may have put something in his ear.  He reports that his ear feels clogged, no tinnitus.  He denies any associated fevers or chills.  He states that he may feel somewhat congested, but otherwise denies any runny nose, sore throat, cough.  Patient's mother states that his young children were recently sick with colds.  Patient states that he is otherwise healthy, not immunosuppressed.  He denies any rashes, pain behind the ear, recent water exposures, or any other complaints.      REVIEW OF SYSTEMS   Review of Systems See HPI    PAST MEDICAL HISTORY:  Past Medical History:   Diagnosis Date    ADHD (attention deficit hyperactivity disorder)     Appendicitis     Asthma, intermittent     Depression     Foot fracture     Kidney stones        PAST SURGICAL HISTORY:  Past Surgical History:   Procedure Laterality Date    APPENDECTOMY      CHG X-RAY RETROGRADE PYELOGRAM Left 8/7/2020    Procedure:  CYSTOURETEROSCOPY, WITH RETROGRADE PYELOGRAM, AND STENT INSERTION LEFT;  Surgeon: Adrián Zamora MD;  Location: [facility] OR;  Service: Urology    COMBINED CYSTOSCOPY, INSERT STENT URETER(S) Left 8/21/2020    Procedure: CYSTOSCOPY, STENT EXTRACTION, WITH FLEXIBLE URETEROSCOPIC CALCULUS REMOVAL  AND STENT INSERTION;  Surgeon: Adrián Zamora MD;  Location: [facility];  Service: Urology           CURRENT MEDICATIONS:    cefdinir (OMNICEF) 300 MG capsule  ciprofloxacin-dexAMETHasone (CIPRODEX) 0.3-0.1 % otic suspension  CITALOPRAM HYDROBROMIDE PO  Methylphenidate HCl (RITALIN PO)  nortriptyline (PAMELOR) 25 MG capsule        ALLERGIES:  Allergies   Allergen Reactions    Amoxicillin Hives    Strawberry Extract Hives       FAMILY HISTORY:  Family History   Problem Relation Age of Onset    Allergies Other         multiple family members    Lung Cancer Maternal Grandfather     Asthma No family hx of        SOCIAL HISTORY:   Social History     Socioeconomic History    Marital status: Single   Tobacco Use    Smoking status: Every Day     Current packs/day: 1.00     Average packs/day: 1 pack/day for 4.0 years (4.0 ttl pk-yrs)     Types: Cigarettes    Smokeless tobacco: Never   Substance and Sexual Activity    Alcohol use: Yes     Alcohol/week: 0.0 standard drinks of alcohol     Comment: Alcoholic Drinks/day: social very rare 1 drink per year    Drug use: Not Currently     Types: Marijuana   Social History Narrative    Lives at home with mother and younger sister.        VITALS:  /80   Pulse 95   Temp 97.6  F (36.4  C) (Oral)   Resp 16   Wt 83.9 kg (185 lb)   SpO2 99%   BMI 23.75 kg/m      PHYSICAL EXAM    Constitutional:  Alert, in no acute distress. Cooperative.  EYES: Conjunctivae clear.   HENT:  Atraumatic, normocephalic. Left external and internal ears normal with TM with with mild injection, no erythema or perforation. Right external ear normal without overlying skin changes, TM with  suspected perforation with area of recent bleeding, no active bleeding. TM with surrounding erythema and bulging. Normal nose. Oropharynx without erythema, swelling, or exudates. Tonsils 1+ and symmetrical. Uvula midline without edema. No evidence of tonsillar or peritonsillar abscess. Moist membranes. No dental pain or periapical swelling/fluctuance. No submandibular swelling. No trismus. No buccal edema or erythema. Tolerates secretions. No nuchal rigidity. Full ROM neck without pain. No palpable cervical lymphadenopathy. Trachea midline with normal phonation.   Respiratory:  Respirations even, unlabored, in no acute respiratory distress. No cough. Speaks in full sentences easily.  Cardiovascular:  Regular rate, good peripheral perfusion.   GI: Soft, flat, non-distended.   Musculoskeletal:  No edema. No cyanosis. Range of motion major extremities intact.    Integument: Warm, Dry. No rash to visualized skin.  Neurologic:  Alert & oriented. No focal deficits noted.  Psych: Normal mood and affect.      LAB:  All pertinent labs reviewed and interpreted.       RADIOLOGY:  Reviewed all pertinent imaging. Please see official radiology report.  No orders to display     Halley Mariano PA-C  [facility] EMERGENCY ROOM  [address] [facility] [address] [phone]        Halley Mariano PA-C  04/13/25 9653